# Patient Record
Sex: FEMALE | Race: WHITE | Employment: FULL TIME | URBAN - METROPOLITAN AREA
[De-identification: names, ages, dates, MRNs, and addresses within clinical notes are randomized per-mention and may not be internally consistent; named-entity substitution may affect disease eponyms.]

---

## 2020-12-13 ENCOUNTER — APPOINTMENT (OUTPATIENT)
Dept: CT IMAGING | Age: 25
End: 2020-12-13
Payer: COMMERCIAL

## 2020-12-13 ENCOUNTER — APPOINTMENT (OUTPATIENT)
Dept: ULTRASOUND IMAGING | Age: 25
End: 2020-12-13
Payer: COMMERCIAL

## 2020-12-13 ENCOUNTER — HOSPITAL ENCOUNTER (EMERGENCY)
Age: 25
Discharge: HOME | End: 2020-12-13
Attending: EMERGENCY MEDICINE
Payer: COMMERCIAL

## 2020-12-13 VITALS
WEIGHT: 277 LBS | HEART RATE: 74 BPM | SYSTOLIC BLOOD PRESSURE: 147 MMHG | OXYGEN SATURATION: 95 % | TEMPERATURE: 97.1 F | RESPIRATION RATE: 18 BRPM | BODY MASS INDEX: 44.52 KG/M2 | DIASTOLIC BLOOD PRESSURE: 77 MMHG | HEIGHT: 66 IN

## 2020-12-13 LAB
ALBUMIN SERPL-MCNC: 4.4 GM/DL (ref 3.4–5)
ALP BLD-CCNC: 169 IU/L (ref 40–129)
ALT SERPL-CCNC: 26 U/L (ref 10–40)
ANION GAP SERPL CALCULATED.3IONS-SCNC: 9 MMOL/L (ref 4–16)
AST SERPL-CCNC: 26 IU/L (ref 15–37)
BACTERIA: ABNORMAL /HPF
BASOPHILS ABSOLUTE: 0 K/CU MM
BASOPHILS RELATIVE PERCENT: 0.4 % (ref 0–1)
BILIRUB SERPL-MCNC: 0.2 MG/DL (ref 0–1)
BILIRUBIN URINE: ABNORMAL MG/DL
BLOOD, URINE: NEGATIVE
BUN BLDV-MCNC: 9 MG/DL (ref 6–23)
CALCIUM SERPL-MCNC: 9.1 MG/DL (ref 8.3–10.6)
CAST TYPE: ABNORMAL /HPF
CHLORIDE BLD-SCNC: 103 MMOL/L (ref 99–110)
CLARITY: ABNORMAL
CO2: 26 MMOL/L (ref 21–32)
COLOR: YELLOW
CREAT SERPL-MCNC: 0.7 MG/DL (ref 0.6–1.1)
CRYSTAL TYPE: NEGATIVE /HPF
DIFFERENTIAL TYPE: ABNORMAL
EOSINOPHILS ABSOLUTE: 0.2 K/CU MM
EOSINOPHILS RELATIVE PERCENT: 2.5 % (ref 0–3)
EPITHELIAL CELLS, UA: 3 /HPF
GFR AFRICAN AMERICAN: >60 ML/MIN/1.73M2
GFR NON-AFRICAN AMERICAN: >60 ML/MIN/1.73M2
GLUCOSE BLD-MCNC: 113 MG/DL (ref 70–99)
GLUCOSE, URINE: NEGATIVE MG/DL
HCG QUALITATIVE: NEGATIVE
HCT VFR BLD CALC: 41.3 % (ref 37–47)
HEMOGLOBIN: 12.8 GM/DL (ref 12.5–16)
ICTOTEST: NEGATIVE
IMMATURE NEUTROPHIL %: 0.5 % (ref 0–0.43)
KETONES, URINE: NEGATIVE MG/DL
LEUKOCYTE ESTERASE, URINE: NEGATIVE
LIPASE: 17 IU/L (ref 13–60)
LYMPHOCYTES ABSOLUTE: 2.2 K/CU MM
LYMPHOCYTES RELATIVE PERCENT: 24.2 % (ref 24–44)
MCH RBC QN AUTO: 24.8 PG (ref 27–31)
MCHC RBC AUTO-ENTMCNC: 31 % (ref 32–36)
MCV RBC AUTO: 79.9 FL (ref 78–100)
MONOCYTES ABSOLUTE: 0.4 K/CU MM
MONOCYTES RELATIVE PERCENT: 3.8 % (ref 0–4)
NITRITE URINE, QUANTITATIVE: NEGATIVE
PDW BLD-RTO: 14.3 % (ref 11.7–14.9)
PH, URINE: 6.5 (ref 5–8)
PLATELET # BLD: 312 K/CU MM (ref 140–440)
PMV BLD AUTO: 9.7 FL (ref 7.5–11.1)
POTASSIUM SERPL-SCNC: 4.1 MMOL/L (ref 3.5–5.1)
PROTEIN UA: NEGATIVE MG/DL
RBC # BLD: 5.17 M/CU MM (ref 4.2–5.4)
RBC URINE: 0 /HPF (ref 0–6)
SEGMENTED NEUTROPHILS ABSOLUTE COUNT: 6.3 K/CU MM
SEGMENTED NEUTROPHILS RELATIVE PERCENT: 68.6 % (ref 36–66)
SODIUM BLD-SCNC: 138 MMOL/L (ref 135–145)
SPECIFIC GRAVITY UA: 1.03 (ref 1–1.03)
TOTAL IMMATURE NEUTOROPHIL: 0.05 K/CU MM
TOTAL PROTEIN: 7.7 GM/DL (ref 6.4–8.2)
UROBILINOGEN, URINE: 0.2 MG/DL (ref 0.2–1)
WBC # BLD: 9.2 K/CU MM (ref 4–10.5)
WBC UA: 4 /HPF (ref 0–5)

## 2020-12-13 PROCEDURE — 99283 EMERGENCY DEPT VISIT LOW MDM: CPT

## 2020-12-13 PROCEDURE — 85025 COMPLETE CBC W/AUTO DIFF WBC: CPT

## 2020-12-13 PROCEDURE — 6370000000 HC RX 637 (ALT 250 FOR IP): Performed by: EMERGENCY MEDICINE

## 2020-12-13 PROCEDURE — 93975 VASCULAR STUDY: CPT

## 2020-12-13 PROCEDURE — 74177 CT ABD & PELVIS W/CONTRAST: CPT

## 2020-12-13 PROCEDURE — 80053 COMPREHEN METABOLIC PANEL: CPT

## 2020-12-13 PROCEDURE — 6360000004 HC RX CONTRAST MEDICATION: Performed by: EMERGENCY MEDICINE

## 2020-12-13 PROCEDURE — 84703 CHORIONIC GONADOTROPIN ASSAY: CPT

## 2020-12-13 PROCEDURE — 96374 THER/PROPH/DIAG INJ IV PUSH: CPT

## 2020-12-13 PROCEDURE — 76830 TRANSVAGINAL US NON-OB: CPT

## 2020-12-13 PROCEDURE — 83690 ASSAY OF LIPASE: CPT

## 2020-12-13 PROCEDURE — 6360000002 HC RX W HCPCS: Performed by: EMERGENCY MEDICINE

## 2020-12-13 PROCEDURE — 81001 URINALYSIS AUTO W/SCOPE: CPT

## 2020-12-13 RX ORDER — MAGNESIUM HYDROXIDE/ALUMINUM HYDROXICE/SIMETHICONE 120; 1200; 1200 MG/30ML; MG/30ML; MG/30ML
30 SUSPENSION ORAL ONCE
Status: COMPLETED | OUTPATIENT
Start: 2020-12-13 | End: 2020-12-13

## 2020-12-13 RX ORDER — LIDOCAINE HYDROCHLORIDE 20 MG/ML
5 SOLUTION OROPHARYNGEAL 3 TIMES DAILY PRN
Qty: 100 ML | Refills: 0 | Status: SHIPPED | OUTPATIENT
Start: 2020-12-13 | End: 2022-09-28

## 2020-12-13 RX ORDER — PANTOPRAZOLE SODIUM 20 MG/1
20 TABLET, DELAYED RELEASE ORAL DAILY
Qty: 30 TABLET | Refills: 0 | Status: SHIPPED | OUTPATIENT
Start: 2020-12-13 | End: 2022-09-28

## 2020-12-13 RX ORDER — LIDOCAINE HYDROCHLORIDE 20 MG/ML
15 SOLUTION OROPHARYNGEAL ONCE
Status: COMPLETED | OUTPATIENT
Start: 2020-12-13 | End: 2020-12-13

## 2020-12-13 RX ORDER — DICYCLOMINE HYDROCHLORIDE 10 MG/1
20 CAPSULE ORAL ONCE
Status: COMPLETED | OUTPATIENT
Start: 2020-12-13 | End: 2020-12-13

## 2020-12-13 RX ORDER — ALUMINA, MAGNESIA, AND SIMETHICONE 2400; 2400; 240 MG/30ML; MG/30ML; MG/30ML
20 SUSPENSION ORAL 3 TIMES DAILY PRN
Qty: 355 ML | Refills: 0 | Status: SHIPPED | OUTPATIENT
Start: 2020-12-13 | End: 2022-09-28

## 2020-12-13 RX ORDER — KETOROLAC TROMETHAMINE 30 MG/ML
30 INJECTION, SOLUTION INTRAMUSCULAR; INTRAVENOUS ONCE
Status: COMPLETED | OUTPATIENT
Start: 2020-12-13 | End: 2020-12-13

## 2020-12-13 RX ADMIN — IOPAMIDOL 75 ML: 755 INJECTION, SOLUTION INTRAVENOUS at 13:42

## 2020-12-13 RX ADMIN — DICYCLOMINE HYDROCHLORIDE 20 MG: 10 CAPSULE ORAL at 12:55

## 2020-12-13 RX ADMIN — LIDOCAINE HYDROCHLORIDE 15 ML: 20 SOLUTION ORAL; TOPICAL at 14:38

## 2020-12-13 RX ADMIN — ALUMINUM HYDROXIDE, MAGNESIUM HYDROXIDE, AND SIMETHICONE 30 ML: 200; 200; 20 SUSPENSION ORAL at 14:38

## 2020-12-13 RX ADMIN — KETOROLAC TROMETHAMINE 30 MG: 30 INJECTION, SOLUTION INTRAMUSCULAR; INTRAVENOUS at 14:38

## 2020-12-13 SDOH — HEALTH STABILITY: MENTAL HEALTH: HOW OFTEN DO YOU HAVE A DRINK CONTAINING ALCOHOL?: NEVER

## 2020-12-13 ASSESSMENT — PAIN SCALES - GENERAL
PAINLEVEL_OUTOF10: 7
PAINLEVEL_OUTOF10: 10

## 2020-12-13 NOTE — ED TRIAGE NOTES
Pt presents to ED with c/o abdominal pain that started at 8 am this morning. Pt denies any NV or diarrhea.

## 2020-12-13 NOTE — ED PROVIDER NOTES
EMERGENCY DEPARTMENT ENCOUNTER      CHIEF COMPLAINT:   Abdominal pain    HPI: Charli Wisdom is a 31 y.o. female who presents to the Emergency Department complaining of epigastric abdominal pain. The patient states that the pain started at 8 AM this morning. It is located in the epigastrium and is burning and sharp in nature. She rates it as 10 out of 10 pain. She denies any associated nausea, vomiting, diarrhea or constipation. She denies a history of GERD or ulcers. The pain is been constant. . There are no exacerbating or relieving factors. She has a history of left ovarian cancer for which she had her left ovary removed and states that she is in remission. The patient denies fevers, chills, hematemesis, bloody stools, flank pain, or any other complaints. REVIEW OF SYSTEMS:  CONSTITUTIONAL:  Denies fever, chills, weight loss or weakness  EYES:  Denies photophobia or discharge  ENT:  Denies sore throat or ear pain  CARDIOVASCULAR:  Denies chest pain, palpitations or swelling  RESPIRATORY:  Denies cough or shortness of breath  GI: See HPI  MUSCULOSKELETAL:  Denies back pain  SKIN:  No rash  NEUROLOGIC:  Denies headache, focal weakness or sensory changes  All systems negative except as marked. \"Remaining review of systems reviewed and negative. I have reviewed the nursing triage documentation and agree unless otherwise noted below. \"    PAST MEDICAL HISTORY:   Past Medical History:   Diagnosis Date    Diabetes mellitus ([facility]     Ovarian ca [facility])        CURRENT MEDICATIONS:   Home medications reviewed. SURGICAL HISTORY:   History reviewed. No pertinent surgical history. FAMILY HISTORY:   No family history on file.     SOCIAL HISTORY:   Social History     Socioeconomic History    Marital status: Single     Spouse name: Not on file    Number of children: Not on file    Years of education: Not on file    Highest education level: Not on file   Occupational History    Not on file   Social Needs    Normal heart rate, Normal rhythm  Pulmonary/Chest:  Normal breath sounds, No respiratory distress, No wheezing  Abdomen: Bowel sounds normal, Soft, epigastric tenderness to palpation with no guarding or rebound, No masses, No pulsatile masses  Back:  No tenderness, No CVA tenderness  Extremities:  Normal range of motion, Intact distal pulses, No edema, No tenderness  Skin:  Warm, Dry, No erythema, No rash      EKG:    None    Radiology / Procedures:     US NON OB TRANSVAGINAL (Final result)  Result time 12/14/20 51:03:58  Final result by Melissa Garcia MD (12/14/20 48:10:50)                Impression:    2 adjacent simple cysts/dominant follicles in the right ovary, largest   measuring up to 2.6 cm.  One of the cysts may have a thin internal septation   without a suspicious solid component seen.  No follow-up imaging is   recommended. Normal Doppler flow in the right ovary. Left ovary is surgically absent. Narrative:    EXAMINATION:   PELVIC ULTRASOUND; DOPPLER EVALUATION OF THE PELVIS     12/13/2020     TECHNIQUE:   Transvaginal pelvic ultrasound was performed.; DOPPLER ULTRASOUND OF THE   PELVIS  Color Doppler evaluation was performed. COMPARISON:   CT abdomen and pelvis from the same day     HISTORY:   ORDERING SYSTEM PROVIDED HISTORY: Abdominal pain, ovarian mass with history   of ovarian cancer, evalaute for torsion   TECHNOLOGIST PROVIDED HISTORY:   Reason for exam:->Abdominal pain, ovarian mass with history of ovarian   cancer, evalaute for torsion   Reason for Exam: upper abdominal pain, abnormal CT   Acuity: Acute   Type of Exam: Subsequent/Follow-up     FINDINGS:     Measurements:     Uterus:  8.0 x 3.7 x 4.3 cm     Endometrial stripe:  8 mm     Right Ovary:  6.4 x 2.8 x 4.4 cm     Left Ovary:  Surgically absent       Ultrasound Findings:     Uterus: Uterus demonstrates normal myometrial echotexture.  Nabothian cysts   are seen at the cervix.  There is fluid in the cervix. Endometrial stripe: Endometrial stripe is within normal limits. Right Ovary: Right ovary is within normal limits.  There is a dominant   follicle/simple cyst in the right ovary 2.6 x 1.6 x 1.8 cm.  There is a   separate dominant follicle/simple cyst in the right ovary measuring 2.1 x 2.4   x 2.1 cm.  One of these cysts may have a thin internal septation.  Normal   arterial and venous color Doppler flow is seen. Left Ovary:  Surgically absent.  No discrete focal lesion is seen in the left   adnexa. Free Fluid: Small volume free fluid is seen in the pelvis.                     US DUP ABD PEL RETRO SCROT COMPLETE (Final result)  Result time 12/14/20 99:92:81  Final result by Kaylyn Schwab, MD (12/14/20 51:98:45)                Impression:    2 adjacent simple cysts/dominant follicles in the right ovary, largest   measuring up to 2.6 cm.  One of the cysts may have a thin internal septation   without a suspicious solid component seen.  No follow-up imaging is   recommended. Normal Doppler flow in the right ovary. Left ovary is surgically absent. Narrative:    EXAMINATION:   PELVIC ULTRASOUND; DOPPLER EVALUATION OF THE PELVIS     12/13/2020     TECHNIQUE:   Transvaginal pelvic ultrasound was performed.; DOPPLER ULTRASOUND OF THE   PELVIS  Color Doppler evaluation was performed.      COMPARISON:   CT abdomen and pelvis from the same day     HISTORY:   ORDERING SYSTEM PROVIDED HISTORY: Abdominal pain, ovarian mass with history   of ovarian cancer, evalaute for torsion   TECHNOLOGIST PROVIDED HISTORY:   Reason for exam:->Abdominal pain, ovarian mass with history of ovarian   cancer, evalaute for torsion   Reason for Exam: upper abdominal pain, abnormal CT   Acuity: Acute   Type of Exam: Subsequent/Follow-up     FINDINGS:     Measurements:     Uterus:  8.0 x 3.7 x 4.3 cm     Endometrial stripe:  8 mm     Right Ovary:  6.4 x 2.8 x 4.4 cm     Left Ovary:  Surgically absent       Ultrasound Findings:     Uterus: Uterus demonstrates normal myometrial echotexture.  Nabothian cysts   are seen at the cervix.  There is fluid in the cervix. Endometrial stripe: Endometrial stripe is within normal limits. Right Ovary: Right ovary is within normal limits.  There is a dominant   follicle/simple cyst in the right ovary 2.6 x 1.6 x 1.8 cm.  There is a   separate dominant follicle/simple cyst in the right ovary measuring 2.1 x 2.4   x 2.1 cm.  One of these cysts may have a thin internal septation.  Normal   arterial and venous color Doppler flow is seen. Left Ovary:  Surgically absent.  No discrete focal lesion is seen in the left   adnexa. Free Fluid: Small volume free fluid is seen in the pelvis.                     CT ABDOMEN PELVIS W IV CONTRAST Additional Contrast? None (Final result)  Result time 12/13/20 13:59:48  Final result by Mireya Lilly MD (12/13/20 13:59:48)                Impression:    1. 6.8 cm indeterminate ovarian cyst. Recommend prompt follow-up with pelvic   US.  Reference: J Am Rafael Radiol 2013;10:675-681   2. Moderate splenomegaly of uncertain etiology. Narrative:    EXAMINATION:   CT OF THE ABDOMEN AND PELVIS WITH CONTRAST 12/13/2020 1:37 pm     TECHNIQUE:   CT of the abdomen and pelvis was performed with the administration of   intravenous contrast. Multiplanar reformatted images are provided for review. Dose modulation, iterative reconstruction, and/or weight based adjustment of   the mA/kV was utilized to reduce the radiation dose to as low as reasonably   achievable. COMPARISON:   None. HISTORY:   ORDERING SYSTEM PROVIDED HISTORY: Abdominal pain   TECHNOLOGIST PROVIDED HISTORY:   Reason for exam:->Abdominal pain   Additional Contrast?->None   Reason for Exam: Abdominal pain     FINDINGS:   Lower Chest: Unremarkable. Organs: Probable flash filling hemangioma of segment 4 of the liver.    Gallbladder is unremarkable without biliary ductal dilatation.  Pancreas is   unremarkable.  Adrenals are unremarkable.  Moderate splenomegaly.  No renal   calculi or hydronephrosis.  Vasculature is unremarkable. GI/Bowel: Bowel is non-dilated without wall thickening.  Appendix is normal.     Pelvis: Complex cystic lesion of the right adnexa measuring 6.8 cm     Peritoneum/Retroperitoneum:No free fluid, free air, organized fluid   collection or lymphadenopathy. Bones: Multilevel degenerative disc disease. Labs Reviewed   CBC WITH AUTO DIFFERENTIAL - Abnormal; Notable for the following components:       Result Value    MCH 24.8 (*)     MCHC 31.0 (*)     Segs Relative 68.6 (*)     Immature Neutrophil % 0.5 (*)     All other components within normal limits   COMPREHENSIVE METABOLIC PANEL - Abnormal; Notable for the following components:    Glucose 113 (*)     Alkaline Phosphatase 169 (*)     All other components within normal limits   URINALYSIS WITH MICROSCOPIC - Abnormal; Notable for the following components:    Bilirubin Urine SMALL NUMBER OR AMOUNT OBSERVED (*)     Bacteria, UA FEW (*)     All other components within normal limits   LIPASE   HCG, SERUM, QUALITATIVE   ICTOTEST, URINE       ED COURSE & MEDICAL DECISION MAKING:  Pertinent Labs & Imaging studies reviewed. (See chart for details)  On exam, the patient is afebrile and nontoxic appearing. She is mildly hypertensive but is asymptomatic and is instructed to have this rechecked as an outpatient. She is otherwise hemodynamically stable and neurologically intact. Labs are obtained and there are no clinically significant lab abnormalities. CT abdomen pelvis was obtained and shows a 6.8 cm indeterminate ovarian cyst for which a prompt follow-up with a pelvic ultrasound is recommended. There is moderate splenomegaly of uncertain etiology. Pelvic ultrasound shows 2 adjacent simple cysts/dominant follicles in the right ovary, the largest measuring up to 2.6 cm.   One of the cysts may have a thin internal septation without a suspicious solid component seen. No follow-up imaging is recommended. Results were discussed with the patient. The patient was treated with Bentyl without relief. She was then treated with a GI cocktail and Toradol and felt significantly better. Abdomen was reexamined and was benign. The patient was tolerating oral fluids without difficulty. I suspect that the patient has epigastric abdominal pain secondary to gastritis versus peptic ulcer disease. I have a low suspicion for acute appendicitis, intraabdominal abscess, perforation, bowel obstruction, AAA, dissection, ischemic bowel, or acute surgical abdomen. I feel that the patient is stable for outpatient management with follow up in 2-3 days. She is given return precautions. . The patient verbalized understanding, was agreeable with plan, and the patient was discharged home in stable condition. Clinical Impression:  1. Abdominal pain, epigastric    2.  Right ovarian cyst        Disposition referral (if applicable):  Bertrand Meigs, MD  [address] [place] [address].  [phone]    Schedule an appointment as soon as possible for a visit in 3 days      [address]  [phone]  Go to   If symptoms worsen      Disposition medications (if applicable):  Discharge Medication List as of 12/13/2020  4:00 PM      START taking these medications    Details   pantoprazole (PROTONIX) 20 MG tablet Take 1 tablet by mouth daily, Disp-30 tablet, R-0Print      aluminum & magnesium hydroxide-simethicone (MAALOX ADVANCED MAX ST) 400-400-40 MG/5ML SUSP Take 20 mLs by mouth 3 times daily as needed (Abdominal pain), Disp-355 mL, R-0Print      lidocaine viscous hcl (XYLOCAINE) 2 % SOLN solution Take 5 mLs by mouth 3 times daily as needed (Abdominal pain), Disp-100 mL, R-0Print               Comment: Please note this report has been produced using speech recognition software and may contain errors related to that system including errors in grammar, punctuation, and spelling, as well as words and phrases that may be inappropriate. If there are any questions or concerns please feel free to contact the dictating provider for clarification.         Brunilda Dickerson MD  12/18/20 [id number]

## 2022-09-28 ENCOUNTER — HOSPITAL ENCOUNTER (EMERGENCY)
Age: 27
Discharge: HOME | End: 2022-09-28
Attending: EMERGENCY MEDICINE
Payer: COMMERCIAL

## 2022-09-28 VITALS
DIASTOLIC BLOOD PRESSURE: 75 MMHG | HEIGHT: 62 IN | RESPIRATION RATE: 15 BRPM | HEART RATE: 85 BPM | OXYGEN SATURATION: 98 % | BODY MASS INDEX: 51.53 KG/M2 | SYSTOLIC BLOOD PRESSURE: 123 MMHG | TEMPERATURE: 97.9 F | WEIGHT: 280 LBS

## 2022-09-28 DIAGNOSIS — T15.92XA FOREIGN BODY OF LEFT EXTERNAL EYE, INITIAL ENCOUNTER: Primary | ICD-10-CM

## 2022-09-28 PROCEDURE — 6370000000 HC RX 637 (ALT 250 FOR IP): Performed by: EMERGENCY MEDICINE

## 2022-09-28 PROCEDURE — 65205 REMOVE FOREIGN BODY FROM EYE: CPT

## 2022-09-28 PROCEDURE — 99283 EMERGENCY DEPT VISIT LOW MDM: CPT

## 2022-09-28 RX ORDER — TETRACAINE HYDROCHLORIDE 5 MG/ML
2 SOLUTION OPHTHALMIC ONCE
Status: COMPLETED | OUTPATIENT
Start: 2022-09-28 | End: 2022-09-28

## 2022-09-28 RX ORDER — NORETHINDRONE ACETATE AND ETHINYL ESTRADIOL, ETHINYL ESTRADIOL AND FERROUS FUMARATE 1MG-10(24)
KIT ORAL
COMMUNITY
Start: 2022-08-27

## 2022-09-28 RX ORDER — ERYTHROMYCIN 5 MG/G
OINTMENT OPHTHALMIC
Qty: 3.5 G | Refills: 0 | Status: SHIPPED | OUTPATIENT
Start: 2022-09-28 | End: 2022-10-08

## 2022-09-28 RX ADMIN — FLUORESCEIN SODIUM 1 MG: 1 STRIP OPHTHALMIC at 15:16

## 2022-09-28 RX ADMIN — TETRACAINE HYDROCHLORIDE 2 DROP: 5 SOLUTION OPHTHALMIC at 15:16

## 2022-09-28 ASSESSMENT — PAIN DESCRIPTION - FREQUENCY: FREQUENCY: CONTINUOUS

## 2022-09-28 ASSESSMENT — VISUAL ACUITY
OU: 20/20
OD: 20/25
OS: 20/20

## 2022-09-28 ASSESSMENT — PAIN DESCRIPTION - ORIENTATION: ORIENTATION: LEFT

## 2022-09-28 ASSESSMENT — PAIN SCALES - GENERAL: PAINLEVEL_OUTOF10: 7

## 2022-09-28 ASSESSMENT — PAIN - FUNCTIONAL ASSESSMENT: PAIN_FUNCTIONAL_ASSESSMENT: 0-10

## 2022-09-28 ASSESSMENT — PAIN DESCRIPTION - DESCRIPTORS: DESCRIPTORS: STABBING

## 2022-09-28 ASSESSMENT — PAIN DESCRIPTION - PAIN TYPE: TYPE: ACUTE PAIN

## 2022-09-28 ASSESSMENT — PAIN DESCRIPTION - LOCATION: LOCATION: EYE

## 2022-09-28 NOTE — Clinical Note
Kenney Celestin was seen and treated in our emergency department on 9/28/2022. She may return to work on 09/29/2022. If you have any questions or concerns, please don't hesitate to call.       Adelso Harris MD

## 2022-09-28 NOTE — ED NOTES
Pt reports left eye irritation started at work feels like she has something in her eye       Adriana Faith, ARNOLDO  09/28/22 1394

## 2022-09-28 NOTE — ED PROVIDER NOTES
Emergency Department Encounter    Patient: Ron Pappas  MRN: [id number]  : 1995  Date of Evaluation: 10/1/2022  ED Provider:  Suzanne Aguilar MD    Triage Chief Complaint:   Eye Problem (Left eye irritation while at work today )    [place]:  Ron Pappas is a 28 y.o. female that presents complaining of 7 out of 10 irritating pain in the left eye after she states she was at work and suddenly developed discomfort. Patient denies any changes in her vision. No contact lens use. No trauma to the eye. Patient does not know of any specific particulate matter in the area although she states that she did not feel like something was in her eye. Patient reports that momentarily she will get relief but then the discomfort returns. No radiation. No skin erythema or swelling. No headache. No treatment prior to arrival.  Symptoms began just prior to arrival.    ROS - see HPI, below listed is current ROS at time of my eval:  General:  No fevers, no chills  Eyes:  No recent vison changes, + left eye pain  ENT:  No sore throat, no nasal congestion, no hearing changes  Musculoskeletal:  No muscle pain, no joint pain  Skin:  No rash, no pruritis  Neurologic:  no headache    Past Medical History:   Diagnosis Date    [facility]     [facility] ca [facility])      History reviewed. No pertinent surgical history. History reviewed. No pertinent family history.   Social History     Socioeconomic History    Marital status: Single     Spouse name: Not on file    Number of children: Not on file    Years of education: Not on file    Highest education level: Not on file   Occupational History    Not on file   Tobacco Use    Smoking status: Never    Smokeless tobacco: Never   Substance and Sexual Activity    Alcohol use: Never    Drug use: Never    Sexual activity: Not on file   Other Topics Concern    Not on file   Social History Narrative    Not on file     Social Determinants of Health     Financial Resource Strain: Not on file Food Insecurity: Not on file   Transportation Needs: Not on file   Physical Activity: Not on file   Stress: Not on file   Social Connections: Not on file   Intimate Partner Violence: Not on file   Housing Stability: Not on file     No current facility-administered medications for this encounter. Current Outpatient Medications   Medication Sig Dispense Refill    erythromycin (ROMYCIN) 5 MG/GM ophthalmic ointment Apply 1/4 inch ribbon to Left eye twice daily 3.5 g 0    metFORMIN (GLUCOPHAGE) 500 MG tablet take 1 tablet by mouth twice a day with meals      LO LOESTRIN FE 1 MG-10 MCG / 10 MCG tablet take 1 tablet by mouth once daily DONT TAKE INACTIVE PILLS       Allergies   Allergen Reactions    Iron Rash       Nursing Notes Reviewed    Physical Exam:  Triage   ED Triage Vitals [09/28/22 1448]   Enc Vitals Group      /75      Heart Rate 85      Resp 15      Temp 97.9 °F (36.6 °C)      Temp src       SpO2 98 %      Weight 280 lb (127 kg)      Height 5' 2\" (1.575 m)      Head Circumference       Peak Flow       Pain Score       Pain Loc       Pain Edu? Excl. in [address]? My pulse ox interpretation is - normal    General appearance:  No acute distress. Skin:  Warm. Dry. Eye:  Extraocular movements intact. PERRL. No significant conjunctival injection. No discharge from the eye. No foreign body. Eyelids were everted with no foreign body. Slit-lamp exam revealed small floating foreign body. No corneal abrasion. No globe rupture. No hyphema. No evidence of globe trauma. Ears, nose, mouth and throat:  Oral mucosa moist   Cardiovascular: Regular rate and rhythm with no murmurs rubs or gallops  Respiratory: Clear to auscultation bilaterally with no wheezes rhonchi or rales  Neck:  Trachea midline. Extremity:  Normal ROM             Neurological:  Alert and oriented     MDM:   Patient presenting with eye irritation as above.   Exam did reveal a small floating foreign body in the left eye which was treated with eye irrigation with improvement on reevaluation. No evidence of corneal abrasion or ulceration. Patient will be treated as below. Patient provided ophthalmology follow-up. Physical exam does not reveal any evidence of ocular injury, patient's visual acuity is intact, and there are no other concerning findings. Patient understands and outpatient follow-up is important, if persistent with ophthalmology, otherwise with primary care physician, and understand and agree with the plan, outpatient follow-up instructions given, return warnings given. Clinical Impression:  1. Foreign body of left external eye, initial encounter      Disposition referral (if applicable):  Radhames Reyes, [address] [address]  [phone]    Schedule an appointment as soon as possible for a visit     Disposition medications (if applicable):  Discharge Medication List as of 9/28/2022  3:26 PM        START taking these medications    Details   erythromycin (ROMYCIN) 5 MG/GM ophthalmic ointment Apply 1/4 inch ribbon to Left eye twice daily, Disp-3.5 g, R-0, Normal           ED Provider Disposition Time  DISPOSITION Decision To Discharge 09/28/2022 03:11:12 PM      Comment: Please note this report has been produced using speech recognition software and may contain errors related to that system including errors in grammar, punctuation, and spelling, as well as words and phrases that may be inappropriate. Efforts were made to edit the dictations.         Darion Sidhu MD  10/01/22 5994

## 2022-09-28 NOTE — ED NOTES
Patient discharged with 1 new rx. Patient verbalized understanding of discharge instructions and follow up care.       Annelise Hwang RN  09/28/22 [id number]

## 2022-09-28 NOTE — Clinical Note
Brian Mccormick was seen and treated in our emergency department on 9/28/2022. She may return to work on 09/29/2022. If you have any questions or concerns, please don't hesitate to call.       Paulette Jin MD

## 2022-10-07 ENCOUNTER — HOSPITAL ENCOUNTER (EMERGENCY)
Age: 27
Discharge: HOME | End: 2022-10-07
Attending: EMERGENCY MEDICINE
Payer: COMMERCIAL

## 2022-10-07 ENCOUNTER — APPOINTMENT (OUTPATIENT)
Dept: GENERAL RADIOLOGY | Age: 27
End: 2022-10-07
Payer: COMMERCIAL

## 2022-10-07 VITALS
SYSTOLIC BLOOD PRESSURE: 146 MMHG | WEIGHT: 288 LBS | HEART RATE: 75 BPM | TEMPERATURE: 97.2 F | HEIGHT: 61 IN | BODY MASS INDEX: 54.37 KG/M2 | DIASTOLIC BLOOD PRESSURE: 89 MMHG | OXYGEN SATURATION: 97 % | RESPIRATION RATE: 16 BRPM

## 2022-10-07 DIAGNOSIS — S96.912A MUSCLE STRAIN OF LEFT FOOT, INITIAL ENCOUNTER: Primary | ICD-10-CM

## 2022-10-07 PROCEDURE — 99283 EMERGENCY DEPT VISIT LOW MDM: CPT

## 2022-10-07 PROCEDURE — 73630 X-RAY EXAM OF FOOT: CPT

## 2022-10-07 ASSESSMENT — PAIN SCALES - GENERAL: PAINLEVEL_OUTOF10: 6

## 2022-10-07 ASSESSMENT — PAIN DESCRIPTION - ORIENTATION: ORIENTATION: LEFT

## 2022-10-07 ASSESSMENT — PAIN DESCRIPTION - LOCATION: LOCATION: FOOT

## 2022-10-07 ASSESSMENT — PAIN - FUNCTIONAL ASSESSMENT: PAIN_FUNCTIONAL_ASSESSMENT: 0-10

## 2022-10-07 NOTE — LETTER
[address] [facility] [address]  Phone: [phone]  Fax: [phone]               October 7, 2022    Patient: Kenney Celestin   YOB: 1995   Date of Visit: 10/7/2022       To Whom It May Concern:    Kenney Celestin was seen and treated in our emergency department on 10/7/2022. She may return to work on 10/10/2022.       Sincerely,       Frederick Londono RN         Signature:__________________________________

## 2022-10-07 NOTE — ED PROVIDER NOTES
EMERGENCY DEPARTMENT ENCOUNTER      CHIEF COMPLAINT:   Left foot pain    HPI: Susan Michelle is a 28 y.o. female who presents to the Emergency Department complaining of left foot pain. The patient states the pain started 1 week ago. She denies any known trauma. It feels achy and is located to the arch of the foot and radiates medially up the leg. The pain is constant. It is worse with weightbearing and better with rest.  She denies any redness, warmth or swelling. She denies any numbness or tingling. She denies any motor weakness. The patient denies fevers, chills, chest pain, shortness of breath, abdominal pain, numbness, tingling, weakness, or any other complaints. REVIEW OF SYSTEMS:  CONSTITUTIONAL:  Denies fever, chills, weight loss or weakness  EYES:  Denies photophobia or discharge  ENT:  Denies sore throat or ear pain  CARDIOVASCULAR:  Denies chest pain, palpitations or swelling  RESPIRATORY:  Denies cough or shortness of breath  GI: Denies abdominal pain, nausea, vomiting, or diarrhea  MUSCULOSKELETAL: See HPI  SKIN:  No rash  NEUROLOGIC:  Denies headache, focal weakness or sensory changes  All systems negative except as marked. \"Remaining review of systems reviewed and negative. I have reviewed the nursing triage documentation and agree unless otherwise noted below. \"    PAST MEDICAL HISTORY:   Past Medical History:   Diagnosis Date    Diabetes mellitus ([facility] [address]     Ovarian ca [facility])        CURRENT MEDICATIONS:   Home medications reviewed. SURGICAL HISTORY:   History reviewed. No pertinent surgical history. FAMILY HISTORY:   History reviewed. No pertinent family history.     SOCIAL HISTORY:   Social History     Socioeconomic History    Marital status: Single     Spouse name: Not on file    Number of children: Not on file    Years of education: Not on file    Highest education level: Not on file   Occupational History    Not on file   Tobacco Use    Smoking status: Never    Smokeless tobacco: Never Substance and Sexual Activity    Alcohol use: Never    Drug use: Never    Sexual activity: Not on file   Other Topics Concern    Not on file   Social History Narrative    Not on file     Social Determinants of Health     Financial Resource Strain: Not on file   Food Insecurity: Not on file   Transportation Needs: Not on file   Physical Activity: Not on file   Stress: Not on file   Social Connections: Not on file   Intimate Partner Violence: Not on file   Housing Stability: Not on file       ALLERGIES: Iron    PHYSICAL EXAM:  VITAL SIGNS:   ED Triage Vitals [10/07/22 1447]   Enc Vitals Group      BP (!) 146/89      Heart Rate 75      Resp 16      Temp 97.2 °F (36.2 °C)      Temp src       SpO2 97 %      Weight 288 lb (130.6 kg)      Height 5' 1\" (1.549 m)      Head Circumference       Peak Flow       Pain Score       Pain Loc       Pain Edu? Excl. in [address]? Constitutional:  Non-toxic appearance  HENT: Normocephalic, Atraumatic  Eyes: PERRL, conjunctiva normal   Neck: Normal range of motion, No tenderness, Supple, No stridor, No lymphadenopathy  Cardiovascular:  Normal heart rate, Normal rhythm  Pulmonary/Chest:  Normal breath sounds, No respiratory distress, No wheezing  Abdomen: Bowel sounds normal, Soft, No tenderness, No masses, No pulsatile masses  Extremities: There is tenderness to palpation of the arch of the left foot as well as the left medial foot, no redness, warmth or swelling, the peripheral pulses are strong, Capillary refill is brisk, there is normal motor and sensory function, the foot is pink, warm, and well perfused, there is no cyanosis  Skin:  Warm, Dry, No erythema, No rash      EKG:    None    Radiology / Procedures:  XR FOOT LEFT (MIN 3 VIEWS) (Final result)  Result time 10/07/22 15:42:01  Final result by Lisy Nix MD (10/07/22 15:42:01)                Impression:    No acute osseous abnormality of the left foot.              Narrative:    EXAMINATION:   THREE XRAY VIEWS OF THE LEFT FOOT     10/7/2022 2:54 pm     COMPARISON:   None. HISTORY:   ORDERING SYSTEM PROVIDED HISTORY: pain   TECHNOLOGIST PROVIDED HISTORY:   Reason for exam:->pain   Reason for Exam: left foot pain     Left foot pain, acute     FINDINGS:   No acute fracture or dislocation. Joint alignment and joint spaces are maintained. No erosions are present. No acute periosteal reaction. ED COURSE & MEDICAL DECISION MAKING:  Pertinent Labs & Imaging studies reviewed. (See chart for details)  On exam, the patient is afebrile and nontoxic appearing. She is mildly hypertensive but is asymptomatic. She is otherwise hemodynamically stable and neurologically intact. Left foot x-rays are obtained and there is no acute osseous abnormality noted. The patient was placed in a walking boot. I suspect that the patient has a left foot strain. I have a low suspicion for DVT, acute fracture, dislocation, compartment syndrome, necrotizing fasciitis, or neurovascular injury. I feel that the patient is stable for outpatient management with follow up in 2-3 days. Return precautions are given. The patient verbalized understanding, was agreeable with plan, and the patient was discharged home in stable condition. Clinical Impression:  1. Muscle strain of left foot, initial encounter        Disposition referral (if applicable): Your PCP    Schedule an appointment as soon as possible for a visit       [address]  [phone]  Go to   If symptoms worsen    Disposition medications (if applicable):  Discharge Medication List as of 10/7/2022  4:17 PM            Comment: Please note this report has been produced using speech recognition software and may contain errors related to that system including errors in grammar, punctuation, and spelling, as well as words and phrases that may be inappropriate.  If there are any questions or concerns please feel free to contact the dictating provider for clarification.         Annalise Ramirez MD  10/07/22 4815

## 2023-04-04 ENCOUNTER — HOSPITAL ENCOUNTER (EMERGENCY)
Age: 28
Discharge: HOME | End: 2023-04-04
Attending: EMERGENCY MEDICINE
Payer: COMMERCIAL

## 2023-04-04 VITALS
BODY MASS INDEX: 52.76 KG/M2 | SYSTOLIC BLOOD PRESSURE: 114 MMHG | TEMPERATURE: 97.9 F | RESPIRATION RATE: 16 BRPM | OXYGEN SATURATION: 97 % | DIASTOLIC BLOOD PRESSURE: 50 MMHG | HEIGHT: 62 IN | WEIGHT: 286.7 LBS | HEART RATE: 83 BPM

## 2023-04-04 DIAGNOSIS — U07.1 COVID-19: Primary | ICD-10-CM

## 2023-04-04 PROCEDURE — 6360000002 HC RX W HCPCS: Performed by: EMERGENCY MEDICINE

## 2023-04-04 PROCEDURE — 96372 THER/PROPH/DIAG INJ SC/IM: CPT

## 2023-04-04 PROCEDURE — 99284 EMERGENCY DEPT VISIT MOD MDM: CPT

## 2023-04-04 RX ORDER — OMEPRAZOLE 40 MG/1
40 CAPSULE, DELAYED RELEASE ORAL DAILY
COMMUNITY
Start: 2023-03-22

## 2023-04-04 RX ORDER — KETOROLAC TROMETHAMINE 30 MG/ML
30 INJECTION, SOLUTION INTRAMUSCULAR; INTRAVENOUS ONCE
Status: COMPLETED | OUTPATIENT
Start: 2023-04-04 | End: 2023-04-04

## 2023-04-04 RX ADMIN — KETOROLAC TROMETHAMINE 30 MG: 30 INJECTION, SOLUTION INTRAMUSCULAR; INTRAVENOUS at 17:56

## 2023-04-04 ASSESSMENT — PAIN SCALES - GENERAL: PAINLEVEL_OUTOF10: 8

## 2023-04-04 NOTE — ED NOTES
Discharge instructions reviewed with patient. Medications and follow up were discussed.  Patient denies further questions and verbalizes understanding      Rashmi Keenan RN  04/04/23 1678

## 2023-04-04 NOTE — ED NOTES
The patient presents to the er today with complaints of testing positive for COVID-19 yesterday. She reports of waking up this morning with body aches. She reports of taking Tylenol. The call light is within reach.                   Rex Armstrong RN  04/04/23 9769